# Patient Record
Sex: FEMALE | Race: WHITE | NOT HISPANIC OR LATINO
[De-identification: names, ages, dates, MRNs, and addresses within clinical notes are randomized per-mention and may not be internally consistent; named-entity substitution may affect disease eponyms.]

---

## 2020-11-23 ENCOUNTER — APPOINTMENT (OUTPATIENT)
Dept: OBGYN | Facility: CLINIC | Age: 45
End: 2020-11-23
Payer: COMMERCIAL

## 2020-11-23 VITALS
SYSTOLIC BLOOD PRESSURE: 126 MMHG | HEIGHT: 64 IN | WEIGHT: 205.25 LBS | DIASTOLIC BLOOD PRESSURE: 80 MMHG | BODY MASS INDEX: 35.04 KG/M2

## 2020-11-23 DIAGNOSIS — Z80.1 FAMILY HISTORY OF MALIGNANT NEOPLASM OF TRACHEA, BRONCHUS AND LUNG: ICD-10-CM

## 2020-11-23 DIAGNOSIS — Z01.419 ENCOUNTER FOR GYNECOLOGICAL EXAMINATION (GENERAL) (ROUTINE) W/OUT ABNORMAL FINDINGS: ICD-10-CM

## 2020-11-23 DIAGNOSIS — Z00.00 ENCOUNTER FOR GENERAL ADULT MEDICAL EXAMINATION W/OUT ABNORMAL FINDINGS: ICD-10-CM

## 2020-11-23 DIAGNOSIS — Z80.8 FAMILY HISTORY OF MALIGNANT NEOPLASM OF OTHER ORGANS OR SYSTEMS: ICD-10-CM

## 2020-11-23 DIAGNOSIS — Z87.891 PERSONAL HISTORY OF NICOTINE DEPENDENCE: ICD-10-CM

## 2020-11-23 DIAGNOSIS — Z86.59 PERSONAL HISTORY OF OTHER MENTAL AND BEHAVIORAL DISORDERS: ICD-10-CM

## 2020-11-23 DIAGNOSIS — Z83.3 FAMILY HISTORY OF DIABETES MELLITUS: ICD-10-CM

## 2020-11-23 PROCEDURE — 99072 ADDL SUPL MATRL&STAF TM PHE: CPT

## 2020-11-23 PROCEDURE — 99396 PREV VISIT EST AGE 40-64: CPT

## 2020-11-23 RX ORDER — ESCITALOPRAM OXALATE 20 MG/1
20 TABLET, FILM COATED ORAL
Refills: 0 | Status: ACTIVE | COMMUNITY

## 2020-11-23 NOTE — HISTORY OF PRESENT ILLNESS
[Condoms] : uses condoms [Y] : Patient is sexually active [Regular Cycle Intervals] : periods have been regular [Frequency: Q ___ days] : menstrual periods occur approximately every [unfilled] days [Menarche Age: ____] : age at menarche was [unfilled] [PGHxTotal] : 2 [[facility]] : 1 [PGHxPremature] : 0 [PGHxAbortions] : 1 [[facility]] : 1 [PGHxABInduced] : 1 [PGHxABSpont] : 0 [PGHxEctopic] : 0 [PGHxMultBirths] : 0

## 2020-11-24 LAB — HPV HIGH+LOW RISK DNA PNL CVX: NOT DETECTED

## 2020-11-30 LAB — CYTOLOGY CVX/VAG DOC THIN PREP: ABNORMAL

## 2020-12-23 PROBLEM — Z01.419 ENCOUNTER FOR GYNECOLOGICAL EXAMINATION: Status: RESOLVED | Noted: 2020-11-23 | Resolved: 2020-12-23

## 2021-03-11 ENCOUNTER — NON-APPOINTMENT (OUTPATIENT)
Age: 46
End: 2021-03-11

## 2022-09-28 ENCOUNTER — APPOINTMENT (OUTPATIENT)
Dept: OBGYN | Facility: CLINIC | Age: 47
End: 2022-09-28

## 2022-09-28 VITALS
DIASTOLIC BLOOD PRESSURE: 80 MMHG | SYSTOLIC BLOOD PRESSURE: 140 MMHG | BODY MASS INDEX: 35.34 KG/M2 | WEIGHT: 207 LBS | HEIGHT: 64 IN

## 2022-09-28 PROCEDURE — 99396 PREV VISIT EST AGE 40-64: CPT

## 2022-09-28 NOTE — HISTORY OF PRESENT ILLNESS
[FreeTextEntry1] : 47yo P2 presents for annual GYN exam. denies any GI/ complaints\par LMP 09/28/2022

## 2022-09-28 NOTE — PHYSICAL EXAM
[Appropriately responsive] : appropriately responsive [Alert] : alert [No Acute Distress] : no acute distress [Soft] : soft [Non-tender] : non-tender [Non-distended] : non-distended [No HSM] : No HSM [No Lesions] : no lesions [No Mass] : no mass [Oriented x3] : oriented x3 [Examination Of The Breasts] : a normal appearance [No Masses] : no breast masses were palpable [Labia Majora] : normal [Labia Minora] : normal [Scant] : There was scant vaginal bleeding [Normal] : normal [Uterine Adnexae] : normal

## 2022-09-28 NOTE — PLAN
[FreeTextEntry1] : PLAN:\par \par Normal GYN exam\par PAP smear w/ HPV obtained\par GC/CT- obtained\par \par mammogram referral given\par \par RTO 1 yr or prn

## 2022-09-28 NOTE — HISTORY OF PRESENT ILLNESS
[FreeTextEntry1] : 45yo P2 presents for annual GYN exam. denies any GI/ complaints\par LMP 09/28/2022

## 2022-09-29 ENCOUNTER — APPOINTMENT (OUTPATIENT)
Dept: DERMATOLOGY | Facility: CLINIC | Age: 47
End: 2022-09-29

## 2022-09-29 LAB
C TRACH RRNA SPEC QL NAA+PROBE: NOT DETECTED
HPV HIGH+LOW RISK DNA PNL CVX: NOT DETECTED
N GONORRHOEA RRNA SPEC QL NAA+PROBE: NOT DETECTED
SOURCE TP AMPLIFICATION: NORMAL

## 2022-09-29 PROCEDURE — 99203 OFFICE O/P NEW LOW 30 MIN: CPT

## 2022-10-03 LAB — CYTOLOGY CVX/VAG DOC THIN PREP: NORMAL

## 2023-03-14 ENCOUNTER — APPOINTMENT (OUTPATIENT)
Dept: ORTHOPEDIC SURGERY | Facility: CLINIC | Age: 48
End: 2023-03-14
Payer: COMMERCIAL

## 2023-03-14 VITALS — WEIGHT: 207 LBS | BODY MASS INDEX: 35.34 KG/M2 | HEIGHT: 64 IN

## 2023-03-14 DIAGNOSIS — M65.4 RADIAL STYLOID TENOSYNOVITIS [DE QUERVAIN]: ICD-10-CM

## 2023-03-14 PROCEDURE — 99204 OFFICE O/P NEW MOD 45 MIN: CPT | Mod: 25

## 2023-03-14 PROCEDURE — 73110 X-RAY EXAM OF WRIST: CPT | Mod: RT

## 2023-03-14 PROCEDURE — 20550 NJX 1 TENDON SHEATH/LIGAMENT: CPT

## 2023-03-14 NOTE — IMAGING
[de-identified] : Right wrist with mild swelling at radial styloid, no erythema, skin intact. +ttp at radial styloid, +finkelsetins. -ttp at thumb cmc, MP, IP and A1 pulley. Able to make fist, oppose thumb to small finger and abduct fingers. Sensation intact throughout. <2sec cap refill.\par \par Right wrist radiographs without fracture nor dislocation.

## 2023-03-14 NOTE — ASSESSMENT
[FreeTextEntry1] : Right DeQuervain's Tenosynovitis - reviewed and pathoanatomy with patient and discussed treatment ladder including NSAIDs prn, bracing, activity modification, CSI and potentially 1st dorsal compartment release. After discussion of risks/benefits to CSI, patient elected to proceed.\par \par Procedure 1 - 0.5cc 1%lidocaine + 0.5cc 40mg/cc Kenalog administered to right 1st extensor compartment following sterilization with Betadine. Patient tolerated this well.\par \par F/u 3months

## 2023-03-14 NOTE — HISTORY OF PRESENT ILLNESS
[de-identified] : 47F, RHD, PMHX of Depression presents with right wrist pain which presented suddenly end of February 2023. patient repots going to [facility], radiographs obtained and negative for fracture/dislocaiton (does not have disc today). Was given a brace, wore it for 2 weeks without relief. Denies numbness/tingling. Patient reports pain starts at the base of the thumb and radiates down into the wrist.

## 2023-10-10 ENCOUNTER — APPOINTMENT (OUTPATIENT)
Dept: OBGYN | Facility: CLINIC | Age: 48
End: 2023-10-10
Payer: COMMERCIAL

## 2023-10-10 VITALS
BODY MASS INDEX: 35.06 KG/M2 | HEIGHT: 64 IN | SYSTOLIC BLOOD PRESSURE: 120 MMHG | WEIGHT: 205.38 LBS | DIASTOLIC BLOOD PRESSURE: 80 MMHG

## 2023-10-10 DIAGNOSIS — Z12.4 ENCOUNTER FOR SCREENING FOR MALIGNANT NEOPLASM OF CERVIX: ICD-10-CM

## 2023-10-10 DIAGNOSIS — Z12.31 ENCOUNTER FOR SCREENING MAMMOGRAM FOR MALIGNANT NEOPLASM OF BREAST: ICD-10-CM

## 2023-10-10 PROCEDURE — 99396 PREV VISIT EST AGE 40-64: CPT

## 2023-10-17 LAB — CYTOLOGY CVX/VAG DOC THIN PREP: NORMAL

## 2023-12-03 ENCOUNTER — NON-APPOINTMENT (OUTPATIENT)
Age: 48
End: 2023-12-03

## 2023-12-11 ENCOUNTER — NON-APPOINTMENT (OUTPATIENT)
Age: 48
End: 2023-12-11

## 2023-12-12 ENCOUNTER — RESULT REVIEW (OUTPATIENT)
Age: 48
End: 2023-12-12

## 2023-12-12 ENCOUNTER — NON-APPOINTMENT (OUTPATIENT)
Age: 48
End: 2023-12-12

## 2023-12-15 ENCOUNTER — APPOINTMENT (OUTPATIENT)
Dept: OBGYN | Facility: CLINIC | Age: 48
End: 2023-12-15
Payer: COMMERCIAL

## 2023-12-15 PROCEDURE — 99213 OFFICE O/P EST LOW 20 MIN: CPT | Mod: 95

## 2023-12-15 NOTE — RESULTS/DATA
[TextEntry] : stereotactic biopsy of the left breast-ductal carcinoma in situ, intermediate to high nuclear grade, micropapillary pattern with comedo type necrosis, lobular extension and microcalcifications

## 2023-12-15 NOTE — REASON FOR VISIT
[Home] : at home, [unfilled] , at the time of the visit. [Medical Office: ([facility])___] : at the medical office located in  [Patient] : the patient [Follow-Up] : a follow-up evaluation of [FreeTextEntry2] : a stereotactic biopsy of the left breast.

## 2023-12-27 ENCOUNTER — APPOINTMENT (OUTPATIENT)
Dept: SURGERY | Facility: CLINIC | Age: 48
End: 2023-12-27
Payer: COMMERCIAL

## 2023-12-27 VITALS
BODY MASS INDEX: 30.9 KG/M2 | WEIGHT: 181 LBS | DIASTOLIC BLOOD PRESSURE: 80 MMHG | HEIGHT: 64 IN | SYSTOLIC BLOOD PRESSURE: 124 MMHG

## 2023-12-27 DIAGNOSIS — R92.2 INCONCLUSIVE MAMMOGRAM: ICD-10-CM

## 2023-12-27 DIAGNOSIS — R92.30 INCONCLUSIVE MAMMOGRAM: ICD-10-CM

## 2023-12-27 DIAGNOSIS — R92.1 MAMMOGRAPHIC CALCIFICATION FOUND ON DIAGNOSTIC IMAGING OF BREAST: ICD-10-CM

## 2023-12-27 PROCEDURE — 99205 OFFICE O/P NEW HI 60 MIN: CPT

## 2023-12-27 NOTE — ASSESSMENT
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   We discussed the patient's new diagnosis of DCIS and the pathophysiology of the disease process. We reviewed her work-up, imaging and pathology results to date including her hormone receptor status. We discussed the difference between systemic and local treatment and options for each type of control. We discussed the role of a breast surgeon, medical oncologist, and radiation oncologist in the patients' treatment plan. We discussed the indications of surgery, anti-estrogen therapy, chemotherapy, radiation therapy, and the role of genetic testing. Genetic testing was drawn in the office today.      We reviewed the surgical options of mastectomy versus breast conservation therapy (BCT).  We discussed the equivalent survival outcomes for patients treated with lumpectomy/radiation or mastectomy. We discussed reconstruction in the mastectomy setting including implant-based, autologous, or flat closure, as well as in the lumpectomy setting with reduction/mastopexy. She would likely be a BCT candidate, however, I would like her to obtain an MRI to further evaluate the extent of disease and evaluate for contralateral disease. We also discussed that she would benefit from oncoplastics given the size of the cancer and degree of ptosis to her breasts. Additionally would provide her with better symmetry.      We discussed the role of SLNB in the in situ and invasive setting. SLNB is not indicated at this time, but may be recommended int he setting that invasive cancer is found on final pathology. We discussed the associated risks of lymphedema. Additionally, she did have a palpable left axillary lymph node, and I would like her to go for second look axillary US for further evaluation.   I will refer her on to medical oncology and radiation oncology and have her followup after the above is complete for final surgical planning.   Patient verbalized understanding of all treatment plans. All of her questions were answered to the best of my ability. She was encouraged to call the office for any questions or concerns sooner.   Plan 1. MRI breast 2. Left axillary US 3. Genetics 4. Med onc-[facility]eder 5. Rad onc-[facility] 6. Plastics-Pete  7. Followup after above is complete

## 2023-12-27 NOTE — HISTORY OF PRESENT ILLNESS
[FreeTextEntry1] : Oncology List:  1. Left UOQ, DCIS, grade 2-3, ER 90%, UT 20%       -Approx 4cm area of calcs      -Left palpable adenopathy   Care Team:  ELIGIO Dove   HPI: Patient is a 48-year-old female presenting for initial consultation on 23 for newly diagnosed left DCIS. Patient underwent screening mammogram and breast ultrasound on 23 that demonstrated suspicious clusters of microcalcifications in the left breast. Patient called back on 23 for left diagnostic mammogram that demonstrated suspicious heterogenous grouped amorphous microcalcifications in the upper outer quadrant. Left stereotactic biopsy demonstrated DCIS, intermediate to high nuclear grade, micropapillary pattern with comedo type necrosis, lobular extension and microcalcifications. Patient denies breast pain, lump or nipple discharge.   Breast History: Goes for mammogram every year. No prior breast biopsy. No family history of breast cancer   Smoker No AC or aspirin use NKDA, no latex allergy   Imagin23: ZPR: Screening Mammogram Bilateral: Impression - Suspicious clusters of microcalcifications is seen in the left breast. Magnification views and additional radiographic and sonographic evaluation as needed is recommended. BI-RADS 0   23: ZPR: Breast Ultrasound Complete: Impression - Bilateral cysts. BI-RADS 2   23: ZPR: Diagnostic Mammogram Left: Density C, Impression - Suspicious heterogenous grouped amorphous mircocalcifications upper outer quadrant. Stereotactic core needle biopsy is recommended. BI-RADS 4   23: ZPR: Left Stereotactic Core Biopsy: Bar clip, Impression - DCIS, intermediate to high nuclear grade, micropapillary pattern with comedo type necrosis, lobular extension and microcalcifications, ER 90%, UT 20%, CONCORDANT

## 2023-12-27 NOTE — PHYSICAL EXAM
[Normocephalic] : normocephalic [Atraumatic] : atraumatic [EOMI] : extra ocular movement intact [PERRL] : pupils equal, round and reactive to light [Sclera nonicteric] : sclera nonicteric [Conjunctiva pink] : conjunctiva pink [Supple] : supple [No Supraclavicular Adenopathy] : no supraclavicular adenopathy [No Cervical Adenopathy] : no cervical adenopathy [Examined in the supine and seated position] : examined in the supine and seated position [No Axillary Lymphadenopathy] : no left axillary lymphadenopathy [No Edema] : no edema [Symmetrical] : symmetrical [Bra Size: ___] : Bra Size: [unfilled] [Grade 2] : Ptosis Grade 2 [No dominant masses] : no dominant masses in right breast  [No Nipple Retraction] : no left nipple retraction [No Nipple Discharge] : no left nipple discharge [de-identified] : L slightly larger than R, grade 2-3 ptosis  [de-identified] : Resolving ecchymosis/post-biopsy changes. Palpable density in the UOQ, unclear if this is all post-biopsy hematoma  [de-identified] : Palpable, mobile lymph node

## 2023-12-27 NOTE — PAST MEDICAL HISTORY
[Menarche Age ____] : age at menarche was [unfilled] [Definite ___ (Date)] : the last menstrual period was [unfilled] [Total Preg ___] : G[unfilled] [Live Births ___] : P[unfilled]  [Age At Live Birth ___] : Age at live birth: [unfilled] [FreeTextEntry5] : denies  [FreeTextEntry6] : denies  [FreeTextEntry7] : yes  [FreeTextEntry8] : yes 4 months

## 2024-01-04 ENCOUNTER — NON-APPOINTMENT (OUTPATIENT)
Age: 49
End: 2024-01-04

## 2024-01-04 ENCOUNTER — APPOINTMENT (OUTPATIENT)
Dept: PLASTIC SURGERY | Facility: CLINIC | Age: 49
End: 2024-01-04
Payer: COMMERCIAL

## 2024-01-04 VITALS
DIASTOLIC BLOOD PRESSURE: 84 MMHG | WEIGHT: 181 LBS | HEIGHT: 64 IN | SYSTOLIC BLOOD PRESSURE: 130 MMHG | BODY MASS INDEX: 30.9 KG/M2

## 2024-01-04 DIAGNOSIS — F19.90 OTHER PSYCHOACTIVE SUBSTANCE USE, UNSPECIFIED, UNCOMPLICATED: ICD-10-CM

## 2024-01-04 DIAGNOSIS — F17.290 NICOTINE DEPENDENCE, OTHER TOBACCO PRODUCT, UNCOMPLICATED: ICD-10-CM

## 2024-01-04 PROCEDURE — 99205 OFFICE O/P NEW HI 60 MIN: CPT

## 2024-01-05 ENCOUNTER — OUTPATIENT (OUTPATIENT)
Dept: OUTPATIENT SERVICES | Facility: HOSPITAL | Age: 49
LOS: 1 days | End: 2024-01-05
Payer: COMMERCIAL

## 2024-01-05 ENCOUNTER — APPOINTMENT (OUTPATIENT)
Dept: MRI IMAGING | Facility: CLINIC | Age: 49
End: 2024-01-05
Payer: COMMERCIAL

## 2024-01-05 DIAGNOSIS — D05.12 INTRADUCTAL CARCINOMA IN SITU OF LEFT BREAST: ICD-10-CM

## 2024-01-05 PROCEDURE — 77049 MRI BREAST C-+ W/CAD BI: CPT | Mod: 26

## 2024-01-05 PROCEDURE — A9585: CPT

## 2024-01-05 PROCEDURE — C8937: CPT

## 2024-01-05 PROCEDURE — C8908: CPT

## 2024-01-08 ENCOUNTER — NON-APPOINTMENT (OUTPATIENT)
Age: 49
End: 2024-01-08

## 2024-01-09 RX ORDER — OMEGA-3/DHA/EPA/FISH OIL 300-1000MG
CAPSULE ORAL
Refills: 0 | Status: ACTIVE | COMMUNITY

## 2024-01-09 RX ORDER — MULTIVIT-MIN/IRON/FOLIC ACID/K 18-600-40
CAPSULE ORAL
Refills: 0 | Status: ACTIVE | COMMUNITY

## 2024-01-09 NOTE — ASSESSMENT
[FreeTextEntry1] : 47 y/o woman with left DCIS, for consultation regarding reconstruction.  We discussed breast reconstruction in general, including flap and implant-based reconstruction, as well as the option of oncoplastic breast reconstruction after lumpectomy. This option seems to fit with pt's needs and expectations best, given the size of her breasts and ptosis.  I believe she will achieve a good reconstructive result as well as relief of some of her neck and back symptoms due to the weight of her breasts.   We reviewed the R/B/A of the procedure, including, but not limited to, the risks of decreased sensation or loss of sensation of the NAC, partial or complete NAC necrosis, wound healing problems requiring wound care, especially at the T-junction points of the incisions. I went over the different scar shapes and their positions on the breasts.   We also reviewed risks of surgery in general (bleeding or hematoma requiring a return to the operating room, and infection).   We also went over the potential for a loss of volume of the involved breast after radiation, and how this cannot be precisely predicted preoperatively, and may require further surgery to obtain symmetry.  Patient is to have MRI tomorrow and will review the results with Dr Israel. Dr Freeman to discuss case and best options with Dr Israel as well. Follow up with patient and schedule surgery. All questions and concerns addressed  Plan and patient status as per Dr Freeman

## 2024-01-09 NOTE — PHYSICAL EXAM
[NI] : Normal [Bra Size: _______] : Bra Size: [unfilled] [de-identified] : Grade 3 + Ptotic breasts with left slightly larger than the right. Right NAC more laterally oriented than the left. Involutional change. No palpable masses noted [de-identified] : Excess adipose. Adequate for a unilateral reconstruction or small bilateral recon

## 2024-01-09 NOTE — HISTORY OF PRESENT ILLNESS
[FreeTextEntry1] : "We have some questions."  This is a 47 y/o female who presents to the office with her  today.  She had a recent diagnosis of Left breast DCIS confirmed on stereotactic biopsy. Patient was referred by Dr Israel for a consultation for breast reconstruction, and states that she has some questions about her options for surgery.  She states that she is having her MRI performed tomorrow and understands that those results may impact her choices for surgery and reconstruction.  She is with her  and has no other complaints today.   She reports that she has lost about 34 pounds, initially intentionally, but the then recently more because of stress following her diagnosis. She reports she normally wears a 38 DD bra, but they are a bit big on her since she lost the weight.  Supplements: CBD and THC gummies Jubilance - oxaloacetate   From Dr. Israel's note: Menstrual Hx:. age at menarche was 13. the last menstrual period was 12/9.   Past Gyn Surgeries: denies.   Prior pregnancies: G2 and P1 . Age at live birth: 36   Fertility Treatments: denies.   Birth Control Pill Use: yes.   Breast Feeding History: yes 4 months

## 2024-01-09 NOTE — REASON FOR VISIT
[Initial Eval - Existing Diagnosis] : an initial evaluation of an existing diagnosis [Consultation] : a consultation visit [FreeTextEntry1] : Dr. Joanna Israel

## 2024-01-09 NOTE — REVIEW OF SYSTEMS
[Negative] : Respiratory [Recent Weight Loss (___ Lbs)] : recent [unfilled] ~Ulb weight loss [FreeTextEntry2] : Weight loss was partly intentional and partly due to recent stress over diagnosis.

## 2024-01-16 ENCOUNTER — APPOINTMENT (OUTPATIENT)
Dept: ULTRASOUND IMAGING | Facility: CLINIC | Age: 49
End: 2024-01-16
Payer: COMMERCIAL

## 2024-01-16 ENCOUNTER — OUTPATIENT (OUTPATIENT)
Dept: OUTPATIENT SERVICES | Facility: HOSPITAL | Age: 49
LOS: 1 days | End: 2024-01-16
Payer: COMMERCIAL

## 2024-01-16 DIAGNOSIS — D05.12 INTRADUCTAL CARCINOMA IN SITU OF LEFT BREAST: ICD-10-CM

## 2024-01-16 DIAGNOSIS — Z00.8 ENCOUNTER FOR OTHER GENERAL EXAMINATION: ICD-10-CM

## 2024-01-16 PROCEDURE — 76882 US LMTD JT/FCL EVL NVASC XTR: CPT | Mod: 26,LT

## 2024-01-16 PROCEDURE — 76882 US LMTD JT/FCL EVL NVASC XTR: CPT

## 2024-01-23 ENCOUNTER — APPOINTMENT (OUTPATIENT)
Dept: PLASTIC SURGERY | Facility: CLINIC | Age: 49
End: 2024-01-23
Payer: COMMERCIAL

## 2024-01-23 VITALS
OXYGEN SATURATION: 98 % | DIASTOLIC BLOOD PRESSURE: 82 MMHG | TEMPERATURE: 97.8 F | HEIGHT: 64 IN | WEIGHT: 177 LBS | SYSTOLIC BLOOD PRESSURE: 128 MMHG | RESPIRATION RATE: 16 BRPM | BODY MASS INDEX: 30.22 KG/M2 | HEART RATE: 72 BPM

## 2024-01-23 PROCEDURE — 99214 OFFICE O/P EST MOD 30 MIN: CPT

## 2024-01-23 NOTE — HISTORY OF PRESENT ILLNESS
[FreeTextEntry1] : "I have some questions"  Patient presents with her spouse to discuss the plans for reconstruction after her last visit.  She states now she is not a candidate for oncoplastic reconstruction based on her MRI findings.  She is a candidate for a Left non- nipple sparing mastectomy and is opting likely for bilateral mastectomy.  She is not certain about which route of reconstruction she would be interested in and has some questions with regards to recovery and long term care.

## 2024-01-23 NOTE — PHYSICAL EXAM
[NI] : Normal [Bra Size: _______] : Bra Size: [unfilled] [de-identified] : Grade 3 + Ptosis breasts with left slightly large than left  No palpable masses noted

## 2024-01-23 NOTE — PHYSICAL EXAM
[NI] : Normal [Bra Size: _______] : Bra Size: [unfilled] [de-identified] : Grade 3 + Ptosis breasts with left slightly large than left  No palpable masses noted

## 2024-01-23 NOTE — ASSESSMENT
[FreeTextEntry1] : 47 y/o female for consultation for breast reconstruction after bilateral mastectomy   Bilateral mastectomy is being planned, and pt is here for consultation regarding breast reconstruction. We discussed the spectrum of reconstructive options, including no reconstruction, implant based reconstruction and autologous reconstruction. We discussed the risk/benefit ratio for each of these options. We discussed at length the R/B/A of implant based reconstruction, including the need for a two-stage reconstruction. We discussed that implants are not considered lifetime devices, and may need to be replaced in the future. We discussed the risks of infection requiring implant removal, rupture, capsular contracture and implant exposure (especially following radiation). We discussed at length the R/B/A of abdominally based autologous reconstruction, including, but not limited to complete flap failure, scarring, poor wound healing, bleeding/hematoma, infection, return to OR for attempted salvage. We also discussed the perioperative course, including CTA/MRA for planning, postoperative monitoring and drains and expected length of surgery and hospital stay. She is aware that abdominal weakness is also a possibility.   She has opted for Bilateral mastectomies with Implant -based reconstruction.

## 2024-01-24 ENCOUNTER — APPOINTMENT (OUTPATIENT)
Dept: SURGERY | Facility: CLINIC | Age: 49
End: 2024-01-24
Payer: COMMERCIAL

## 2024-01-24 VITALS
HEIGHT: 64 IN | BODY MASS INDEX: 30.22 KG/M2 | SYSTOLIC BLOOD PRESSURE: 138 MMHG | DIASTOLIC BLOOD PRESSURE: 90 MMHG | WEIGHT: 177 LBS

## 2024-01-24 PROCEDURE — 99215 OFFICE O/P EST HI 40 MIN: CPT

## 2024-01-24 NOTE — PHYSICAL EXAM
[Normocephalic] : normocephalic [Atraumatic] : atraumatic [EOMI] : extra ocular movement intact [PERRL] : pupils equal, round and reactive to light [Sclera nonicteric] : sclera nonicteric [Conjunctiva pink] : conjunctiva pink [Supple] : supple [No Supraclavicular Adenopathy] : no supraclavicular adenopathy [No Cervical Adenopathy] : no cervical adenopathy [Examined in the supine and seated position] : examined in the supine and seated position [Symmetrical] : symmetrical [Bra Size: ___] : Bra Size: [unfilled] [No dominant masses] : no dominant masses in right breast  [No Nipple Retraction] : no left nipple retraction [No Nipple Discharge] : no left nipple discharge [No Axillary Lymphadenopathy] : no left axillary lymphadenopathy [No Edema] : no edema [Asymmetrical] : asymmetrical [Grade 2] : Ptosis Grade 2 [de-identified] : Deferred- exam findings from initial consultation: L>R, grade 2-3 ptosis [de-identified] : Palpable density in the UOQ, unclear if this is all post-biopsy hematoma   [de-identified] : Palpable, mobile lymph node

## 2024-01-24 NOTE — HISTORY OF PRESENT ILLNESS
[FreeTextEntry1] : Oncology List:  1. Left UOQ, DCIS, grade 2-3, ER 90%, [place] 20%       -Approx 4cm area of calcs, MRI 11. 6 cm x 3. 6 cm extends to 1.1 cm deep to nipple      -Left palpable adenopathy, US - benign appearing lymph nodes       -Genetic testing negative for pathogenic mutation; VUS in POLE gene c.3856C>T and [id number] gene c.292G>A   Care Team:  GYN - Petty  Med onc-[facility] Rad onc-[facility] Plastics-[facility]   Interval History:  (24): Patient presents for surgical planning. She met with Dr. Freeman to discuss reconstruction options and Dr. Castro from medical oncology. Has decided on bilateral mastectomy with TE with left SLNB.     HPI: Patient is a 48-year-old female presenting for initial consultation on 23 for newly diagnosed left DCIS. Patient underwent screening mammogram and breast ultrasound on 23 that demonstrated suspicious clusters of microcalcifications in the left breast. Patient called back on 23 for left diagnostic mammogram that demonstrated suspicious heterogenous grouped amorphous microcalcifications in the upper outer quadrant. Left stereotactic biopsy demonstrated DCIS, intermediate to high nuclear grade, micropapillary pattern with comedo type necrosis, lobular extension and microcalcifications. Patient denies breast pain, lump or nipple discharge.   Breast History: Goes for mammogram every year. No prior breast biopsy. No family history of breast cancer   Smoker No AC or aspirin use NKDA, no latex allergy   Imagin23: ZPR: Screening Mammogram Bilateral: Impression - Suspicious clusters of microcalcifications is seen in the left breast. Magnification views and additional radiographic and sonographic evaluation as needed is recommended. BI-RADS 0   23: ZPR: Breast Ultrasound Complete: Impression - Bilateral cysts. BI-RADS 2   23: ZPR: Diagnostic Mammogram Left: Density C, Impression - Suspicious heterogenous grouped amorphous mircocalcifications upper outer quadrant. Stereotactic core needle biopsy is recommended. BI-RADS 4   23: ZPR: Left Stereotactic Core Biopsy: Bar clip, Impression - DCIS, intermediate to high nuclear grade, micropapillary pattern with comedo type necrosis, lobular extension and microcalcifications, ER 90%, [place] 20%, CONCORDANT   24: Tony Borges: MRI Breast: Impression - Newly diagnosed malignancy in the left breast. Extensive asymmetric non mass enhancement occupying most of the upper outer quadrant of the left breast, corresponding in size and distribution to abnormal calcifications noted by mammography, overall enhancement spans up to 11. 6 cm x 3.6 cm. Enhancement extends to just 1.1 cm deep to the nipple. BI-RADS 6  24: Tony Borges: Left Axilla US - Impression - no evidence of left axillary lymphadenopathy. Bilateral benign appearing axillary lymph nodes demonstrated.

## 2024-01-24 NOTE — ASSESSMENT
[FreeTextEntry1] : Patient is a 48 year old female presenting for left DCIS UOQ, grade 2, ER 90%, [place] 20%. Approx 4cm area of calcs, MRI 11. 6 cm x 3. 6 cm extends to 1.1 cm deep to nipple, Left palpable adenopathy, US - benign appearing lymph nodes   She met with Dr. Freeman to discuss reconstruction options and Dr. Castro from medical oncology. We had an extensive discussion regarding unilateral vs bilateral, as well as the need for excision of the NAC. We also discussed axillary surgery options including SLNB (which I favor given the extent of DCIS-even though no invasive component has been proven at this point), vs magtrace injection with plan for return to the OR for SLNB at a later date if upgraded to IDC on final pathology. She would like to proceed with bilateral skin sparing mastectomy with TE and left SLNB. Risks of lymphedema were discussed, as well as lymphedema surveillance. Baseline SOZO obtained in the office today.   Discussed the overnight admission process, need for PST/medical clearance prior to surgery, as well as post-op expectations, recovery, and need for post-op drains. I will go over final pathology results with patient 7-10 days after surgery.   Smoking cessation was also discussed, as well as risks of continued smoking in the perioop setting.   Patient verbalized understanding of all treatment plans. All of her questions were answered to the best of my ability. She was encouraged to call the office for any questions or concerns.   Plan: 1. Bilateral skin sparing mastectomy with TE and left SLNB 2. PST/medical clearance 3. Post op visit  4. Smoking cessation 5. Baseline SOZO L Dex Left -4.4

## 2024-02-06 ENCOUNTER — RESULT REVIEW (OUTPATIENT)
Age: 49
End: 2024-02-06

## 2024-02-06 ENCOUNTER — APPOINTMENT (OUTPATIENT)
Dept: PLASTIC SURGERY | Facility: AMBULATORY MEDICAL SERVICES | Age: 49
End: 2024-02-06
Payer: COMMERCIAL

## 2024-02-06 ENCOUNTER — APPOINTMENT (OUTPATIENT)
Dept: SURGERY | Facility: AMBULATORY MEDICAL SERVICES | Age: 49
End: 2024-02-06
Payer: COMMERCIAL

## 2024-02-06 PROCEDURE — 15860 IV NJX TST VASC FLO FLAP/GRF: CPT

## 2024-02-06 PROCEDURE — 38792 RA TRACER ID OF SENTINL NODE: CPT | Mod: LT,59

## 2024-02-06 PROCEDURE — 15777 ACELLULAR DERM MATRIX IMPLT: CPT | Mod: 50

## 2024-02-06 PROCEDURE — 38900 IO MAP OF SENT LYMPH NODE: CPT | Mod: LT

## 2024-02-06 PROCEDURE — 14301 TIS TRNFR ANY 30.1-60 SQ CM: CPT

## 2024-02-06 PROCEDURE — 14302 TIS TRNFR ADDL 30 SQ CM: CPT

## 2024-02-06 PROCEDURE — 19357 TISS XPNDR PLMT BRST RCNSTJ: CPT | Mod: 50

## 2024-02-06 PROCEDURE — 19303 MAST SIMPLE COMPLETE: CPT | Mod: 50

## 2024-02-06 PROCEDURE — 38525 BIOPSY/REMOVAL LYMPH NODES: CPT | Mod: LT

## 2024-02-14 ENCOUNTER — APPOINTMENT (OUTPATIENT)
Dept: PLASTIC SURGERY | Facility: CLINIC | Age: 49
End: 2024-02-14
Payer: COMMERCIAL

## 2024-02-14 VITALS
WEIGHT: 177 LBS | HEIGHT: 64 IN | DIASTOLIC BLOOD PRESSURE: 88 MMHG | HEART RATE: 65 BPM | SYSTOLIC BLOOD PRESSURE: 130 MMHG | OXYGEN SATURATION: 98 % | BODY MASS INDEX: 30.22 KG/M2

## 2024-02-14 PROCEDURE — 99024 POSTOP FOLLOW-UP VISIT: CPT

## 2024-02-20 ENCOUNTER — APPOINTMENT (OUTPATIENT)
Dept: SURGERY | Facility: CLINIC | Age: 49
End: 2024-02-20
Payer: COMMERCIAL

## 2024-02-20 VITALS
SYSTOLIC BLOOD PRESSURE: 138 MMHG | DIASTOLIC BLOOD PRESSURE: 82 MMHG | WEIGHT: 177 LBS | BODY MASS INDEX: 30.22 KG/M2 | HEIGHT: 64 IN

## 2024-02-20 DIAGNOSIS — Z17.0 MALIGNANT NEOPLASM OF UPPER-OUTER QUADRANT OF LEFT FEMALE BREAST: ICD-10-CM

## 2024-02-20 DIAGNOSIS — Z90.13 ACQUIRED ABSENCE OF BILATERAL BREASTS AND NIPPLES: ICD-10-CM

## 2024-02-20 DIAGNOSIS — D05.12 INTRADUCTAL CARCINOMA IN SITU OF LEFT BREAST: ICD-10-CM

## 2024-02-20 DIAGNOSIS — C50.412 MALIGNANT NEOPLASM OF UPPER-OUTER QUADRANT OF LEFT FEMALE BREAST: ICD-10-CM

## 2024-02-20 PROCEDURE — 99024 POSTOP FOLLOW-UP VISIT: CPT

## 2024-02-20 NOTE — HISTORY OF PRESENT ILLNESS
[FreeTextEntry1] : Oncology List:  1. Left UOQ cTisN0 --> [id number] IDC, grade 1 w/DCIS, grade 2, ER 90%, MO/HER2 negative       -Approx 4cm area of calcs, MRI 11. 6 cm x 3. 6 cm extends to 1.1 cm deep to nipple      -Left palpable adenopathy, US - benign appearing lymph nodes       -Genetic testing negative for pathogenic mutation; VUS in POLE gene c.3856C>T and [id number] gene c.292G>A      -Bilateral skin sparing mastectomy with TE and left SLNB 24        FINAL PATH: right breast - benign breast tissue, left breast - [id number] multifocal (at least 4 foci) invasive ductal carcinoma with micropapillary features, grade 2, largest focus measure 7.0 mm, DCIS intermediate grade, at least 24.0 mm, one lymph node negative, one lymph node positive for micro metastatic carcinoma, one lymph node positive for isolated tumor cells    Care Team:  GYN - Petty  Med onc-[facility] Rad onc-[facility] Plastics-[facility]   Interval History:  (24): Patient presents for surgical planning. She met with Dr. Freeman to discuss reconstruction options and Dr. Castro from medical oncology. Has decided on bilateral mastectomy with TE with left SLNB.    24: Patient presents for post-op followup. Right drain was removed by Dr. Freeman. Left drain still putting out >30cc/day. No complaints at this time.    HPI: Patient is a 48-year-old female presenting for initial consultation on 23 for newly diagnosed left DCIS. Patient underwent screening mammogram and breast ultrasound on 23 that demonstrated suspicious clusters of microcalcifications in the left breast. Patient called back on 23 for left diagnostic mammogram that demonstrated suspicious heterogenous grouped amorphous microcalcifications in the upper outer quadrant. Left stereotactic biopsy demonstrated DCIS, intermediate to high nuclear grade, micropapillary pattern with comedo type necrosis, lobular extension and microcalcifications. Patient denies breast pain, lump or nipple discharge.   Breast History: Goes for mammogram every year. No prior breast biopsy. No family history of breast cancer   Smoker No AC or aspirin use NKDA, no latex allergy   Imagin23: ZPR: Screening Mammogram Bilateral: Impression - Suspicious clusters of microcalcifications is seen in the left breast. Magnification views and additional radiographic and sonographic evaluation as needed is recommended. BI-RADS 0   23: ZPR: Breast Ultrasound Complete: Impression - Bilateral cysts. BI-RADS 2   23: ZPR: Diagnostic Mammogram Left: Density C, Impression - Suspicious heterogenous grouped amorphous mircocalcifications upper outer quadrant. Stereotactic core needle biopsy is recommended. BI-RADS 4   23: ZPR: Left Stereotactic Core Biopsy: Bar clip, Impression - DCIS, intermediate to high nuclear grade, micropapillary pattern with comedo type necrosis, lobular extension and microcalcifications, ER 90%, [place] 20%, CONCORDANT   24: Tony Borges: MRI Breast: Impression - Newly diagnosed malignancy in the left breast. Extensive asymmetric non mass enhancement occupying most of the upper outer quadrant of the left breast, corresponding in size and distribution to abnormal calcifications noted by mammography, overall enhancement spans up to 11. 6 cm x 3.6 cm. Enhancement extends to just 1.1 cm deep to the nipple. BI-RADS 6  24: Tony Borges: Left Axilla US - Impression - no evidence of left axillary lymphadenopathy. Bilateral benign appearing axillary lymph nodes demonstrated.

## 2024-02-20 NOTE — ASSESSMENT
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 Approx 4 cm area of calcs, MRI 11. 6 CM X 3. 6 CM extends to 1.1 cm deep to nipple, left palpable adenopathy, US- benign appearing lymph nodes. She underwent bilateral skin sparing mastectomy with TE and left SLNB. FINAL PATH: right breast - benign breast tissue, left breast - [id number] multifocal invasive ductal carcinoma with micropapillary features (4 foci), grade 2, largest focus measure 7.0 mm, DCIS intermediate grade, size 24.0 mm, one lymph node negative, one lymph node positive for micro metastatic carcinoma, one lymph node positive for isolated tumor cells.  We reviewed post-op expectations and recovery. I explained to her that she is healing well without concern. Left drain is still not ready to be removed. I advised her to call our office if <30cc for two consecutive days and we can remove for her, otherwise, Dr. Freeman will remove it at a followup visit.    We reviewed her pathology in detail and I explained to her that we are complete from a surgical standpoint. We discussed the findings of invasive cancer, as well as the small amount of cancer within her lymph nodes. We discussed potential recommendations for chemo/oncotype and/or PMRT. I advised her to followup w/Dr. Castro asap, and referral for Dr. Grigsby given. Our office was able to assist her in getting these appointments. She should also followup w/Dr. Grimaldo in 3 months for next SOZO measurement.   We discussed her social situation including a father with advanced liposarcoma that will be going on hospice soon. She has an 11-year-old son that knows she had surgery for her breast, but does not know she had breast cancer. Additional resources, including  was offered, however, patient declined at this time.   We will see her back in 6 months for next CBE.  Patient verbalized understanding for all treatment plans discussed. All of her questions were answered to the best of my ability. She was encouraged to call the office if any questions or concerns or come in sooner if needed.   Plan: 1. Rad onc referral-Dr. Grigsby 2. Followup med onc-Dr. Castro 3. PMR referral for lymphedema surveillance 4. Followup in 6 months

## 2024-02-20 NOTE — PHYSICAL EXAM
[Normocephalic] : normocephalic [Atraumatic] : atraumatic [EOMI] : extra ocular movement intact [PERRL] : pupils equal, round and reactive to light [Sclera nonicteric] : sclera nonicteric [Conjunctiva pink] : conjunctiva pink [Supple] : supple [No Supraclavicular Adenopathy] : no supraclavicular adenopathy [No Cervical Adenopathy] : no cervical adenopathy [Examined in the supine and seated position] : examined in the supine and seated position [Asymmetrical] : asymmetrical [Bra Size: ___] : Bra Size: [unfilled] [Grade 2] : Ptosis Grade 2 [No Axillary Lymphadenopathy] : no left axillary lymphadenopathy [No Edema] : no edema [de-identified] : Palpable density in the UOQ, unclear if this is all post-biopsy hematoma   [None] : no ptosis [de-identified] : Bilateral breasts and NAC are surgically absent with TE in place. Skin flaps are well-perfused. No erythema. Left drain output is serosanguinous.  [de-identified] : Palpable, mobile lymph node

## 2024-02-22 NOTE — HISTORY OF PRESENT ILLNESS
[FreeTextEntry1] : "I feel good" Patient presents for follow up and states that she is doing well and that the left drain is putting out more than the right drain.  She states that she is surprised at how good the tissue expanders feel.  She states that she did not take any oxycodone and that she has no discomfort anymore.  She wants to know if she can drive.  She has no complaints today.

## 2024-02-22 NOTE — REASON FOR VISIT
[Follow-Up: _____] : a [unfilled] follow-up visit [FreeTextEntry1] : states has some normal discomfort. Also states no abnormal drainage or bleeding.

## 2024-02-22 NOTE — ASSESSMENT
[FreeTextEntry1] : 49 y/o female for follow up   Plan is for follow up in 2 weeks and 4 weeks   Patient is to follow up with Dr Israel and will likely be able to have the other LYNN drain removed. She is given a new drain sheet and is advised to continue to empty and record twice a day. Patient is advised that she may drive as she is not taking narcotics and can move her arms freely around her body.  Plan and patient status as per Dr Freeman

## 2024-02-22 NOTE — ADDENDUM
[FreeTextEntry1] : All medical record entries were at my direction and personally dictated by me (Dr. Priyanka Freeman). I have reviewed the chart and agree that the record accurately reflects my personal performance of the history, physical exam, assessment and plan.

## 2024-02-22 NOTE — PHYSICAL EXAM
[NI] : Normal [de-identified] : Incisions with steri strips in place C/D/I.  Usama in position.  Nontender bilaterally. Right LYNN drain removed without complication. Optifoam dressing placed on remaining LYNN drain. [de-identified] : No breasts s/p bilateral mastectomies

## 2024-02-29 ENCOUNTER — APPOINTMENT (OUTPATIENT)
Dept: PLASTIC SURGERY | Facility: CLINIC | Age: 49
End: 2024-02-29
Payer: COMMERCIAL

## 2024-02-29 VITALS
TEMPERATURE: 97.7 F | SYSTOLIC BLOOD PRESSURE: 136 MMHG | WEIGHT: 174 LBS | BODY MASS INDEX: 29.71 KG/M2 | HEIGHT: 64 IN | OXYGEN SATURATION: 99 % | DIASTOLIC BLOOD PRESSURE: 82 MMHG | HEART RATE: 82 BPM

## 2024-02-29 PROCEDURE — 99024 POSTOP FOLLOW-UP VISIT: CPT

## 2024-02-29 RX ORDER — OXYCODONE 5 MG/1
5 TABLET ORAL EVERY 6 HOURS
Qty: 5 | Refills: 0 | Status: COMPLETED | COMMUNITY
Start: 2024-02-05 | End: 2024-02-29

## 2024-03-07 ENCOUNTER — APPOINTMENT (OUTPATIENT)
Dept: PLASTIC SURGERY | Facility: CLINIC | Age: 49
End: 2024-03-07
Payer: COMMERCIAL

## 2024-03-07 VITALS
WEIGHT: 175 LBS | BODY MASS INDEX: 29.88 KG/M2 | TEMPERATURE: 98 F | OXYGEN SATURATION: 98 % | HEIGHT: 64 IN | DIASTOLIC BLOOD PRESSURE: 90 MMHG | SYSTOLIC BLOOD PRESSURE: 142 MMHG | HEART RATE: 78 BPM

## 2024-03-07 PROCEDURE — 99024 POSTOP FOLLOW-UP VISIT: CPT

## 2024-03-13 ENCOUNTER — APPOINTMENT (OUTPATIENT)
Dept: PLASTIC SURGERY | Facility: CLINIC | Age: 49
End: 2024-03-13
Payer: COMMERCIAL

## 2024-03-13 VITALS
TEMPERATURE: 98 F | DIASTOLIC BLOOD PRESSURE: 84 MMHG | HEART RATE: 76 BPM | SYSTOLIC BLOOD PRESSURE: 126 MMHG | OXYGEN SATURATION: 97 %

## 2024-03-13 PROCEDURE — 99024 POSTOP FOLLOW-UP VISIT: CPT

## 2024-03-20 ENCOUNTER — APPOINTMENT (OUTPATIENT)
Dept: PLASTIC SURGERY | Facility: CLINIC | Age: 49
End: 2024-03-20
Payer: COMMERCIAL

## 2024-03-20 VITALS
TEMPERATURE: 97.9 F | OXYGEN SATURATION: 98 % | WEIGHT: 175 LBS | HEIGHT: 64 IN | HEART RATE: 70 BPM | SYSTOLIC BLOOD PRESSURE: 128 MMHG | DIASTOLIC BLOOD PRESSURE: 80 MMHG | BODY MASS INDEX: 29.88 KG/M2

## 2024-03-20 PROCEDURE — 99024 POSTOP FOLLOW-UP VISIT: CPT

## 2024-03-20 RX ORDER — RIFAMPIN 300 MG/1
300 CAPSULE ORAL
Qty: 28 | Refills: 0 | Status: COMPLETED | COMMUNITY
Start: 2024-02-29 | End: 2024-03-20

## 2024-03-20 RX ORDER — MINOCYCLINE HYDROCHLORIDE 100 MG/1
100 CAPSULE ORAL
Qty: 28 | Refills: 0 | Status: COMPLETED | COMMUNITY
Start: 2024-02-29 | End: 2024-03-20

## 2024-03-20 RX ORDER — CIPROFLOXACIN HYDROCHLORIDE 500 MG/1
500 TABLET, FILM COATED ORAL
Qty: 28 | Refills: 0 | Status: COMPLETED | COMMUNITY
Start: 2024-02-29 | End: 2024-03-20

## 2024-03-20 RX ORDER — CEPHALEXIN 500 MG/1
500 CAPSULE ORAL 3 TIMES DAILY
Qty: 21 | Refills: 1 | Status: COMPLETED | COMMUNITY
Start: 2024-02-29 | End: 2024-03-20

## 2024-03-20 RX ORDER — CEPHALEXIN 500 MG/1
500 TABLET ORAL 3 TIMES DAILY
Qty: 21 | Refills: 0 | Status: COMPLETED | COMMUNITY
Start: 2024-02-05 | End: 2024-03-20

## 2024-03-20 NOTE — REASON FOR VISIT
[Follow-Up: _____] : a [unfilled] follow-up visit [Spouse] : spouse [FreeTextEntry1] : patient states everything is good and has no complaints.

## 2024-03-20 NOTE — ASSESSMENT
[FreeTextEntry1] : 47 y/o female for follow up   Follow up in one week for fill. Plan is to fill weekly prior to starting chemotherapy/radiation. All questions and concerns addressed. Patient may bathe, but should not walk her large dog that pulls her around. Plan and patient status as per Dr Triana.   Weight of mastectomies on the left is 810g and mastectomy weight on the right is 691g. TE is 650 cc with 200 cc fill intraoperatively bilaterally, 2/29/2024 an additional 60 cc bilaterally, and 3/13/2024 30 cc on the left and 60 cc on the right, 3/20/2024 30 cc on the left and 60 cc on the right, for total of 320 cc on the left and 380 cc on the right.

## 2024-03-20 NOTE — ADDENDUM
[FreeTextEntry1] : All medical record entries were at my direction and personally dictated by me (Dr. Priyanka Freeman). I have reviewed the chart and agree that the record accurately reflects my personal performance of the history, physical exam, assessment and plan.  I believe the TE position is rotated on the left, giving the added upper pole fullness. Adding more fill on the right to try to achieve more upper pole fullness. Weekly follow up to try to achieve a steady state with the fills, prior to RT.

## 2024-03-20 NOTE — PHYSICAL EXAM
[NI] : Normal [de-identified] : Incisions are C/D/I. Nontender bilaterally. drain sites are healing well. Skin of both reconstructed breasts are well moisturized. The TE on the left still appears larger. Will fill the right side more until they are more comparable. After alcohol prep, magnet localization and CHG prep, 60 fill performed on the right and 30cc on the left without difficulty.  Pt tolerated well. [de-identified] : No breasts s/p bilateral mastectomies

## 2024-03-20 NOTE — HISTORY OF PRESENT ILLNESS
[FreeTextEntry1] : "I want to fill them more"  Patient presents for follow up and states that she is happy with way the tissue expanders are filling and would like to continue to go larger.  She has some questions with regards to bathing and walking her dog.  No other complaints today.

## 2024-03-24 NOTE — REASON FOR VISIT
[Follow-Up: _____] : a [unfilled] follow-up visit [Spouse] : spouse [FreeTextEntry1] : patient states she is doing much better and no complaints. She states it still feels weird and not like breasts.

## 2024-03-24 NOTE — ASSESSMENT
[FreeTextEntry1] : 47 y/o female for follow up   Healing well Plan is to follow up in one week. All questions and concerns addressed. Plan and patient status as per Dr Freeman.  Weight of mastectomy on the left is 810 g and mastectomy weight on the right is 691 g. Usama are 650 cc with 200 cc fill intraoperatively on 2/6/24, 60 cc fill bilaterally on 2/29/2024, and 3/13/2024 30 cc on the left and 60 cc on the right for a total of 290 cc on the left and 320 cc on the right.

## 2024-03-24 NOTE — ADDENDUM
[FreeTextEntry1] : All medical record entries were at my direction and personally dictated by me (Dr. Priyanka Freeman). I have reviewed the chart and agree that the record accurately reflects my personal performance of the history, physical exam, assessment and plan. Changes noted above.

## 2024-03-24 NOTE — PHYSICAL EXAM
[NI] : Normal [de-identified] : Incisions are C/D/I. Nontender bilaterally. drain sites are healing well. Skin of both reconstructed breasts are well moisturized. After magnet localization and CHG prep, 60 ml fill on the right and 30 ml fill on the left performed without difficulty. Pt tolerated well. [de-identified] : No breasts s/p bilateral mastectomies

## 2024-03-24 NOTE — HISTORY OF PRESENT ILLNESS
[FreeTextEntry1] : "I feel good"  Patient presents with her  and states that she is feeling good, but that her tissue expanders just feel weird and the left one is higher.  She is going to start radiation and will also find out if she will be needing chemotherapy as well.   She reports she had to stop the antibiotics because she was having leg cramps, dizziness, nausea and vomiting. She had some left calf pain and she saw her PMD who order a duplex scan that was negative. Saturday morning was her last dose of the antibiotics.

## 2024-03-26 ENCOUNTER — APPOINTMENT (OUTPATIENT)
Dept: PLASTIC SURGERY | Facility: CLINIC | Age: 49
End: 2024-03-26
Payer: COMMERCIAL

## 2024-03-26 VITALS
DIASTOLIC BLOOD PRESSURE: 82 MMHG | HEIGHT: 64 IN | BODY MASS INDEX: 29.88 KG/M2 | HEART RATE: 82 BPM | TEMPERATURE: 98.2 F | WEIGHT: 175 LBS | SYSTOLIC BLOOD PRESSURE: 126 MMHG | OXYGEN SATURATION: 98 %

## 2024-03-26 PROCEDURE — 99024 POSTOP FOLLOW-UP VISIT: CPT

## 2024-03-26 NOTE — HISTORY OF PRESENT ILLNESS
[FreeTextEntry1] : Pt reports she is feeling pretty well. Just the numbness on the top of the breasts feels weird. Feeling better about the size.

## 2024-03-26 NOTE — REASON FOR VISIT
[Follow-Up: _____] : a [unfilled] follow-up visit [FreeTextEntry1] : patient states she is doing much better and has no complaints.

## 2024-03-26 NOTE — ASSESSMENT
[FreeTextEntry1] : 47 y/o female for follow up   Healing well Follow up weekly. Will continue fills until pt is happy with the size.  Weight of mastectomies on the left is 810g and mastectomy weight on the right is 691g. TE is 650 cc with 200 cc fill intraoperatively bilaterally, 2/29/2024 an additional 60 cc bilaterally, and 3/13/2024 30 cc on the left and 60 cc on the right, 3/20/2024 30 cc on the left and 60 cc on the right, 60 cc bilaterally on 3/26/24 for total of 380 cc on the left and 440 cc on the right.

## 2024-03-26 NOTE — PHYSICAL EXAM
[NI] : Normal [de-identified] : No breasts s/p bilateral mastectomies [de-identified] : Incisions are healed. drain sites are healing well. Skin of both reconstructed breasts are well moisturized. After magnet localization and CHG prep, 60 ml fill performed bilaterally without difficulty. Pt tolerated well. Right is still softer and left is more firm, but the size is looking better.

## 2024-04-04 ENCOUNTER — APPOINTMENT (OUTPATIENT)
Dept: PLASTIC SURGERY | Facility: CLINIC | Age: 49
End: 2024-04-04
Payer: COMMERCIAL

## 2024-04-04 VITALS
OXYGEN SATURATION: 98 % | DIASTOLIC BLOOD PRESSURE: 68 MMHG | BODY MASS INDEX: 29.88 KG/M2 | HEART RATE: 81 BPM | HEIGHT: 64 IN | SYSTOLIC BLOOD PRESSURE: 126 MMHG | WEIGHT: 175 LBS

## 2024-04-04 PROCEDURE — 99024 POSTOP FOLLOW-UP VISIT: CPT

## 2024-04-04 NOTE — PHYSICAL EXAM
[NI] : Normal [de-identified] : Incisions are healed. Skin of both reconstructed breasts are well moisturized. No fill today. Right is still softer and left is more firm, but the size is looking better. [de-identified] : No breasts s/p bilateral mastectomies

## 2024-04-04 NOTE — ASSESSMENT
[FreeTextEntry1] : Doing very well. Will plan to have her return in two weeks to reassess and decide whether to do any more fill. That should be her last visit with a fill, so I recommended that she call Dr. Grigsby now to let him know that we are planning to have all fills completed by the time of that visit in two weeks.

## 2024-04-04 NOTE — REASON FOR VISIT
[Follow-Up: _____] : a [unfilled] follow-up visit [FreeTextEntry1] : patient states she is doing well and no complaints.

## 2024-04-04 NOTE — HISTORY OF PRESENT ILLNESS
[FreeTextEntry1] : Pt reports she is feeling well and is pretty happy with the size of the recon breasts. She is not sure if she wants to go any bigger. She wants to know when she should make an appointment with the radiation oncologist.

## 2024-04-10 ENCOUNTER — APPOINTMENT (OUTPATIENT)
Dept: PLASTIC SURGERY | Facility: CLINIC | Age: 49
End: 2024-04-10

## 2024-04-17 ENCOUNTER — APPOINTMENT (OUTPATIENT)
Dept: PLASTIC SURGERY | Facility: CLINIC | Age: 49
End: 2024-04-17
Payer: COMMERCIAL

## 2024-04-17 VITALS
HEART RATE: 80 BPM | HEIGHT: 64 IN | TEMPERATURE: 98.2 F | BODY MASS INDEX: 29.71 KG/M2 | WEIGHT: 174 LBS | DIASTOLIC BLOOD PRESSURE: 72 MMHG | SYSTOLIC BLOOD PRESSURE: 122 MMHG | OXYGEN SATURATION: 98 %

## 2024-04-17 PROCEDURE — 99024 POSTOP FOLLOW-UP VISIT: CPT

## 2024-04-17 NOTE — HISTORY OF PRESENT ILLNESS
[FreeTextEntry1] : "I don't want any more fills." Patient presents for follow up and states that she does not want another fill of her TE.  She states that she is happy with the current size.  She also states that she may not go ahead with chemotherapy as she does not want to do it.  She states that she is still awaiting the results of the Oncotype.  She has no other complaints at this time.

## 2024-04-17 NOTE — ADDENDUM
[FreeTextEntry1] : All medical record entries were at my direction and personally dictated by me (Dr. Priyanka Freeman). I have reviewed the chart and agree that the record accurately reflects my personal performance of the history, physical exam, assessment and plan.  I encouraged pt to keep an open mind to chemotherapy, if it is determined to be in her best interest. She did speak with Dr. Grigsby, but he would like to wait for the Oncotype result before scheduling RT.

## 2024-04-17 NOTE — PHYSICAL EXAM
[NI] : Normal [de-identified] : Incisions are C/D/I. Nontender bilaterally. Skin of both reconstructed breasts are well moisturized. No fill today. [de-identified] : No breasts s/p bilateral mastectomies

## 2024-04-17 NOTE — ASSESSMENT
[FreeTextEntry1] : 49 y/o female for follow up  Patient to discuss concerns about chemotherapy with oncology. No fill of TE today or moving forward. Healed well. Follow up in 3 weeks. Plan and patient status as per Dr Freeman.   Weight of mastectomies on the left is 810g and mastectomy weight on the right is 691g. TE is 650 cc with 200 cc fill intraoperatively bilaterally, 2/29/2024 an additional 60 cc bilaterally, and 3/13/2024 30 cc on the left and 60 cc on the right, 3/20/2024 30 cc on the left and 60 cc on the right, 60 cc bilaterally on 3/26/24 for total of 380 cc on the left and 440 cc on the right.

## 2024-05-15 ENCOUNTER — APPOINTMENT (OUTPATIENT)
Dept: PLASTIC SURGERY | Facility: CLINIC | Age: 49
End: 2024-05-15
Payer: COMMERCIAL

## 2024-05-15 VITALS
DIASTOLIC BLOOD PRESSURE: 82 MMHG | TEMPERATURE: 98 F | BODY MASS INDEX: 29.53 KG/M2 | HEART RATE: 103 BPM | SYSTOLIC BLOOD PRESSURE: 110 MMHG | HEIGHT: 64 IN | WEIGHT: 173 LBS | OXYGEN SATURATION: 98 %

## 2024-05-15 DIAGNOSIS — Z42.1 ENCOUNTER FOR BREAST RECONSTRUCTION FOLLOWING MASTECTOMY: ICD-10-CM

## 2024-05-15 DIAGNOSIS — Z90.13 ACQUIRED ABSENCE OF BILATERAL BREASTS AND NIPPLES: ICD-10-CM

## 2024-05-15 DIAGNOSIS — Z09 ENCOUNTER FOR FOLLOW-UP EXAMINATION AFTER COMPLETED TREATMENT FOR CONDITIONS OTHER THAN MALIGNANT NEOPLASM: ICD-10-CM

## 2024-05-15 PROCEDURE — 99213 OFFICE O/P EST LOW 20 MIN: CPT

## 2024-05-15 NOTE — PHYSICAL EXAM
[NI] : Normal [de-identified] : Scars are healed nicely. Skin of both reconstructed breasts are well moisturized. Some irregularity of contour over the Usama as expected. [de-identified] : No breasts, s/p bilateral mastectomies

## 2024-05-15 NOTE — ASSESSMENT
[FreeTextEntry1] : I reassured pt that the Usama can remain in place while she receives all of her adjuvant therapy and that I will continue to monitor her progress. No changes needed at this time. Follow up in 2-3 months. Call for any questions or concerns.

## 2024-05-15 NOTE — HISTORY OF PRESENT ILLNESS
[FreeTextEntry1] : Pt reports she started chemo three weeks ago and her next dose (the second of four) is due soon. She is using the cold cap but her hair is coming out. She plans to get a wig. She reports she is feeling ok physically, but is psychologically having a hard time with the whole process. She states her breasts are ok and is wondering how long the Usama can stay in place.

## 2024-05-15 NOTE — REASON FOR VISIT
[Follow-Up: _____] : a [unfilled] follow-up visit [FreeTextEntry1] : patient states everything is good and has no complaints. She is currently undergoing chemo.

## 2024-06-07 ENCOUNTER — APPOINTMENT (OUTPATIENT)
Dept: PHYSICAL MEDICINE AND REHAB | Facility: CLINIC | Age: 49
End: 2024-06-07
Payer: COMMERCIAL

## 2024-06-07 VITALS
HEART RATE: 80 BPM | HEIGHT: 64 IN | DIASTOLIC BLOOD PRESSURE: 87 MMHG | SYSTOLIC BLOOD PRESSURE: 142 MMHG | WEIGHT: 170 LBS | BODY MASS INDEX: 29.02 KG/M2 | OXYGEN SATURATION: 96 %

## 2024-06-07 DIAGNOSIS — C50.919 MALIGNANT NEOPLASM OF UNSPECIFIED SITE OF UNSPECIFIED FEMALE BREAST: ICD-10-CM

## 2024-06-07 DIAGNOSIS — Z91.89 OTHER SPECIFIED PERSONAL RISK FACTORS, NOT ELSEWHERE CLASSIFIED: ICD-10-CM

## 2024-06-07 PROCEDURE — 93702 BIS XTRACELL FLUID ANALYSIS: CPT

## 2024-06-07 PROCEDURE — 99203 OFFICE O/P NEW LOW 30 MIN: CPT | Mod: 25

## 2024-06-07 NOTE — PHYSICAL EXAM
[FreeTextEntry1] : Gen: Patient is A&O x 3, NAD HEENT: EOMI, hearing grossly normal Resp: regular, non - labored CV: pulses regular Skin: no rashes, erythema Lymph: no clubbing, cyanosis, edema Inspection: no instability ROM: full throughout Palpation: no tenderness to palpation Sensation: intact to light touch Reflexes: 1+ and symmetric throughout Strength: 5/5 throughout Gait: normal, non-antalgic  Bioimpedance spectroscopy with L-dex  LUE -5.9 (pre-operative baseline -4.4)

## 2024-06-07 NOTE — DATA REVIEWED
[FreeTextEntry1] : January 2024 left axilla ultrasound: No evidence of left axillary lymphadenopathy. Bilateral benign-appearing axillary lymph nodes demonstrated  January 2024 MRI breast: Newly diagnosed malignancy in the left breast. Extensive asymmetric non mass enhancement occupying most of the upper outer quadrant of the left breast, corresponding in size and distribution to abnormal calcifications noted by mammography.

## 2024-06-07 NOTE — HISTORY OF PRESENT ILLNESS
[FreeTextEntry1] : Ms. Johnson is a 48 year old female with Left UOQ [id number] --> [id number] IDC, grade 1 w/DCIS, grade 2, ER 90%, KS/HER2 negative, s/p Bilateral skin sparing mastectomy with TE and left SLNB (2/3) 2/6/24, FINAL PATH: right breast - benign breast tissue, left breast - [id number] multifocal (at least 4 foci) invasive ductal carcinoma with micropapillary features, grade 2, largest focus measure 7.0 mm, DCIS intermediate grade, at least 24.0 mm, one lymph node negative, one lymph node positive for micro metastatic carcinoma, one lymph node positive for isolated tumor cells. She is currently going through chemotherapy TC q3 weeks. Plans for radiation after completion of chemotherapy. Plans for reconstruction in the future.   She does a lot of aerobic exercise and does rebound exercise with a mini trampoline. Denies any heaviness or swelling in UE. Denies any pain. Denies any decreased range of motion.

## 2024-06-07 NOTE — ASSESSMENT
[FreeTextEntry1] : 48 year old female here for evaluation.  #At risk for lymphedema: -No clinical signs of lymphedema on exam -Denies any electronic implants or pregnancy  -Lymphedema education provided to patient -Bioimpedance spectroscopy performed today with L-dex appropriate. -Next Surveillance in September 2024 at Dr. Israel's office -Educated on early signs of lymphedema and to call me if she notices any    #Breast Cancer -Reviewed exercise recommendations: Recommend 150 minutes of moderate intensity aerobic exercise and 2-3 strength trainings per week -Reviewed plastic surgery: s/p tissue expander placement on 2/6/24 -Reviewed breast surgery: s/p Bilateral skin sparing mastectomy and left SLNB (2/3) on 2/6/24   -Follow-up in 6 months or sooner as needed.    The patient had a follow-up SOZO measurement which I reviewed today. Bioimpedance spectroscopy helps identify the onset of lymphedema in an arm or leg before patients experience noticeable swelling. Research has shown that the early detection of lymphedema using L-Dex combined with treatment can reduce progression to chronic lymphedema by 95% in breast cancer patients. Whenever possible, patients are tested for baseline L-Dex score before cancer treatment begins and then are reassessed during regular follow-up visits using the SOZO device. Otherwise, this can be started postoperatively and continued during regular follow-up visits. If the patients L-Dex score increases above normal levels, that is a sign that lymphedema is developing, and a referral is made to physical therapy for further evaluation and/or early compression treatment. Lymphedema assessment with the SOZO L-Dex score is recommended to be done every 3 months for the first 3 years and then every 6 months for years 4 and 5, followed by annually afterwards.

## 2024-07-17 ENCOUNTER — APPOINTMENT (OUTPATIENT)
Dept: PLASTIC SURGERY | Facility: CLINIC | Age: 49
End: 2024-07-17
Payer: COMMERCIAL

## 2024-07-17 VITALS
HEIGHT: 64 IN | DIASTOLIC BLOOD PRESSURE: 82 MMHG | TEMPERATURE: 98.2 F | WEIGHT: 170 LBS | OXYGEN SATURATION: 98 % | SYSTOLIC BLOOD PRESSURE: 114 MMHG | HEART RATE: 79 BPM | BODY MASS INDEX: 29.02 KG/M2

## 2024-07-17 DIAGNOSIS — Z90.13 ACQUIRED ABSENCE OF BILATERAL BREASTS AND NIPPLES: ICD-10-CM

## 2024-07-17 PROCEDURE — 99213 OFFICE O/P EST LOW 20 MIN: CPT

## 2024-10-02 ENCOUNTER — APPOINTMENT (OUTPATIENT)
Dept: SURGERY | Facility: CLINIC | Age: 49
End: 2024-10-02
Payer: COMMERCIAL

## 2024-10-02 VITALS
HEIGHT: 64 IN | BODY MASS INDEX: 32.44 KG/M2 | WEIGHT: 190 LBS | DIASTOLIC BLOOD PRESSURE: 84 MMHG | SYSTOLIC BLOOD PRESSURE: 136 MMHG

## 2024-10-02 DIAGNOSIS — C50.412 MALIGNANT NEOPLASM OF UPPER-OUTER QUADRANT OF LEFT FEMALE BREAST: ICD-10-CM

## 2024-10-02 DIAGNOSIS — Z17.0 MALIGNANT NEOPLASM OF UPPER-OUTER QUADRANT OF LEFT FEMALE BREAST: ICD-10-CM

## 2024-10-02 PROCEDURE — 99214 OFFICE O/P EST MOD 30 MIN: CPT | Mod: 25

## 2024-10-02 PROCEDURE — 93702 BIS XTRACELL FLUID ANALYSIS: CPT | Mod: NC

## 2024-10-02 NOTE — PHYSICAL EXAM
[Normocephalic] : normocephalic [Atraumatic] : atraumatic [EOMI] : extra ocular movement intact [PERRL] : pupils equal, round and reactive to light [Sclera nonicteric] : sclera nonicteric [Conjunctiva pink] : conjunctiva pink [Supple] : supple [No Supraclavicular Adenopathy] : no supraclavicular adenopathy [No Cervical Adenopathy] : no cervical adenopathy [Examined in the supine and seated position] : examined in the supine and seated position [None] : no ptosis [No Axillary Lymphadenopathy] : no left axillary lymphadenopathy [No Edema] : no edema [Asymmetrical] : asymmetrical [de-identified] : R>L. Bilateral breasts and NAC are surgically absent with TE in place.  [de-identified] : Radiation changes  [de-identified] : Palpable, mobile lymph node

## 2024-10-02 NOTE — ASSESSMENT
[FreeTextEntry1] : Patient is a 48 year old female with left DCIS UOQ grade 2 ER 90% [place] 20%. Approx 4 cm area of calcs, MRI 11. 6 CM X 3. 6 CM extends to 1.1 cm deep to nipple, left palpable adenopathy, US- benign appearing lymph nodes. She underwent bilateral skin sparing mastectomy with TE and left SLNB. FINAL PATH: right breast - benign breast tissue, left breast - [id number] multifocal invasive ductal carcinoma with micropapillary features (4 foci), grade 2, largest focus measure 7.0 mm, DCIS intermediate grade, size 24.0 mm, one lymph node negative, one lymph node positive for micro metastatic carcinoma, one lymph node positive for isolated tumor cells. Completed adjuvant chemotherapy TC X 4 july 2024 and radiation september 2024.    CBE is benign. We reviewed indications for imaging in the post-mastectomy setting. We will see her back in 6 months for next CBE.   SOZO obtained in the office today. Next measurement with Dr. Grimaldo in 3 months.   Patient verbalized understanding for all treatment plans discussed. All of her questions were answered to the best of my ability. She was encouraged to call the office if any questions or concerns or come in sooner if needed.   Plan: 1. SOZO measurement  -3.9 (baseline -4.4); next measurement Jan '25 2. Followup in 6 months 3. Followup med onc, rad onc, plastics, PMR

## 2024-10-02 NOTE — PHYSICAL EXAM
[Normocephalic] : normocephalic [Atraumatic] : atraumatic [EOMI] : extra ocular movement intact [PERRL] : pupils equal, round and reactive to light [Sclera nonicteric] : sclera nonicteric [Conjunctiva pink] : conjunctiva pink [Supple] : supple [No Supraclavicular Adenopathy] : no supraclavicular adenopathy [No Cervical Adenopathy] : no cervical adenopathy [Examined in the supine and seated position] : examined in the supine and seated position [None] : no ptosis [No Axillary Lymphadenopathy] : no left axillary lymphadenopathy [No Edema] : no edema [Asymmetrical] : asymmetrical [de-identified] : R>L. Bilateral breasts and NAC are surgically absent with TE in place.  [de-identified] : Radiation changes  [de-identified] : Palpable, mobile lymph node

## 2024-10-02 NOTE — PHYSICAL EXAM
[Normocephalic] : normocephalic [Atraumatic] : atraumatic [EOMI] : extra ocular movement intact [PERRL] : pupils equal, round and reactive to light [Sclera nonicteric] : sclera nonicteric [Conjunctiva pink] : conjunctiva pink [Supple] : supple [No Supraclavicular Adenopathy] : no supraclavicular adenopathy [No Cervical Adenopathy] : no cervical adenopathy [Examined in the supine and seated position] : examined in the supine and seated position [None] : no ptosis [No Axillary Lymphadenopathy] : no left axillary lymphadenopathy [No Edema] : no edema [Asymmetrical] : asymmetrical [de-identified] : R>L. Bilateral breasts and NAC are surgically absent with TE in place.  [de-identified] : Radiation changes  [de-identified] : Palpable, mobile lymph node

## 2024-10-02 NOTE — ASSESSMENT
[FreeTextEntry1] : Patient is a 48 year old female with left DCIS UOQ grade 2 ER 90% [place] 20%. Approx 4 cm area of calcs, MRI 11. 6 CM X 3. 6 CM extends to 1.1 cm deep to nipple, left palpable adenopathy, US- benign appearing lymph nodes. She underwent bilateral skin sparing mastectomy with TE and left SLNB. FINAL PATH: right breast - benign breast tissue, left breast - [id number] multifocal invasive ductal carcinoma with micropapillary features (4 foci), grade 2, largest focus measure 7.0 mm, DCIS intermediate grade, size 24.0 mm, one lymph node negative, one lymph node positive for micro metastatic carcinoma, one lymph node positive for isolated tumor cells. Completed adjuvant chemotherapy TC X 4 july 2024 and radiation september 2024.    CBE is benign. We reviewed indications for imaging in the post-mastectomy setting. We will see her back in 6 months for next CBE.   SOZO obtained in the office today. Next measurement with Dr. Grimaldo in 3 months.   Patient verbalized understanding for all treatment plans discussed. All of her questions were answered to the best of my ability. She was encouraged to call the office if any questions or concerns or come in sooner if needed.   Plan: 1. SOZO measurement  -3.9 (baseline -4.4); next measurement Jan '25 2. Followup in 6 months 3. Followup med onc, rad onc, plastics, PMR

## 2024-10-02 NOTE — HISTORY OF PRESENT ILLNESS
[FreeTextEntry1] : Oncology List:  1. 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 6 cm x 3. 6 cm extends to 1.1 cm deep to nipple      -Left palpable adenopathy, US - benign appearing lymph nodes       -Genetic testing negative for pathogenic mutation; VUS in POLE gene c.3856C>T and [id number] gene c.292G>A      -Bilateral skin sparing mastectomy with TE and left SLNB 24        FINAL PATH: right breast - benign breast tissue, left breast - [id number] multifocal (at least 4 foci) invasive ductal carcinoma with micropapillary features, grade 2, largest focus measure 7.0 mm, DCIS intermediate grade, at least 24.0 mm, one lymph node negative, one lymph node positive for micro metastatic carcinoma, one lymph node positive for isolated tumor cells     -Tamoxifen, stopped during chemotherapy/radiation      -Oncotype Dx 32      -s/p chemotherapy TC x 4 2024     -Radiation completed 2024    Care Team:  GYN - Petty  Med onc-[facility] Rad onc-[facility]   Interval History:  (24): Patient presents for surgical planning. She met with Dr. Freeman to discuss reconstruction options and Dr. Castro from medical oncology. Has decided on bilateral mastectomy with TE with left SLNB.    24: Patient presents for post-op followup. Right drain was removed by Dr. Freeman. Left drain still putting out >30cc/day. No complaints at this time.   (10/02/24): Patient presents for 6 month follow up. She completed 4 cycles TC followed by PMRT 24.  Plan for AI with [facility].    HPI: Patient is a 48-year-old female presenting for initial consultation on 23 for newly diagnosed left DCIS. Patient underwent screening mammogram and breast ultrasound on 23 that demonstrated suspicious clusters of microcalcifications in the left breast. Patient called back on 23 for left diagnostic mammogram that demonstrated suspicious heterogenous grouped amorphous microcalcifications in the upper outer quadrant. Left stereotactic biopsy demonstrated DCIS, intermediate to high nuclear grade, micropapillary pattern with comedo type necrosis, lobular extension and microcalcifications. Patient denies breast pain, lump or nipple discharge.   Breast History: Goes for mammogram every year. No prior breast biopsy. No family history of breast cancer   Smoker No AC or aspirin use NKDA, no latex allergy   Imagin23: [facility]PR: Screening Mammogram Bilateral: Impression - Suspicious clusters of microcalcifications is seen in the left breast. Magnification views and additional radiographic and sonographic evaluation as needed is recommended. BI-RADS 0   23: ZPR: Breast Ultrasound Complete: Impression - Bilateral cysts. BI-RADS 2   23: ZPR: Diagnostic Mammogram Left: Density C, Impression - Suspicious heterogenous grouped amorphous mircocalcifications upper outer quadrant. Stereotactic core needle biopsy is recommended. BI-RADS 4   23: ZPR: Left Stereotactic Core Biopsy: Bar clip, Impression - DCIS, intermediate to high nuclear grade, micropapillary pattern with comedo type necrosis, lobular extension and microcalcifications, ER 90%, [place] 20%, [place]   24: Tony Borges: MRI Breast: Impression - Newly diagnosed malignancy in the left breast. Extensive asymmetric non mass enhancement occupying most of the upper outer quadrant of the left breast, corresponding in size and distribution to abnormal calcifications noted by mammography, overall enhancement spans up to 11. 6 cm x 3.6 cm. Enhancement extends to just 1.1 cm deep to the nipple. BI-RADS 6  24: Tony Borges: Left Axilla US - Impression - no evidence of left axillary lymphadenopathy. Bilateral benign appearing axillary lymph nodes demonstrated.

## 2024-10-02 NOTE — ASSESSMENT
[FreeTextEntry1] : Patient is a 48 year old female with left DCIS UOQ grade 2 ER 90% MD 20%. Approx 4 cm area of calcs, MRI 11. 6 CM X 3. 6 CM extends to 1.1 cm deep to nipple, left palpable adenopathy, US- benign appearing lymph nodes. She underwent bilateral skin sparing mastectomy with TE and left SLNB. FINAL PATH: right breast - benign breast tissue, left breast - [id number] multifocal invasive ductal carcinoma with micropapillary features (4 foci), grade 2, largest focus measure 7.0 mm, DCIS intermediate grade, size 24.0 mm, one lymph node negative, one lymph node positive for micro metastatic carcinoma, one lymph node positive for isolated tumor cells. Completed adjuvant chemotherapy TC X 4 july 2024 and radiation september 2024.    CBE is benign. We reviewed indications for imaging in the post-mastectomy setting. We will see her back in 6 months for next CBE.   SOZO obtained in the office today. Next measurement with Dr. Grimaldo in 3 months.   Patient verbalized understanding for all treatment plans discussed. All of her questions were answered to the best of my ability. She was encouraged to call the office if any questions or concerns or come in sooner if needed.   Plan: 1. SOZO measurement  -3.9 (baseline -4.4); next measurement Jan '25 2. Followup in 6 months 3. Followup med onc, rad onc, plastics, PMR

## 2024-10-02 NOTE — HISTORY OF PRESENT ILLNESS
[FreeTextEntry1] : Oncology List:  1. Left UOQ cTisN0 --> [id number] IDC, grade 1 w/DCIS, grade 2, ER 90%, [place]/HER2 negative       -Approx 4cm area of calcs, MRI 11. 6 cm x 3. 6 cm extends to 1.1 cm deep to nipple      -Left palpable adenopathy, US - benign appearing lymph nodes       -Genetic testing negative for pathogenic mutation; VUS in POLE gene c.3856C>T and [id number] gene c.292G>A      -Bilateral skin sparing mastectomy with TE and left SLNB 24        FINAL PATH: right breast - benign breast tissue, left breast - [id number] multifocal (at least 4 foci) invasive ductal carcinoma with micropapillary features, grade 2, largest focus measure 7.0 mm, DCIS intermediate grade, at least 24.0 mm, one lymph node negative, one lymph node positive for micro metastatic carcinoma, one lymph node positive for isolated tumor cells     -Tamoxifen, stopped during chemotherapy/radiation      -Oncotype Dx 32      -s/p chemotherapy TC x 4 2024     -Radiation completed 2024    Care Team:  GYN - Petty  Med onc-[facility] Rad onc-[facility]   Interval History:  (24): Patient presents for surgical planning. She met with Dr. Freeman to discuss reconstruction options and Dr. Castro from medical oncology. Has decided on bilateral mastectomy with TE with left SLNB.    24: Patient presents for post-op followup. Right drain was removed by Dr. Freeman. Left drain still putting out >30cc/day. No complaints at this time.   (10/02/24): Patient presents for 6 month follow up. She completed 4 cycles TC followed by PMRT 24.  Plan for AI with [facility].    HPI: Patient is a 48-year-old female presenting for initial consultation on 23 for newly diagnosed left DCIS. Patient underwent screening mammogram and breast ultrasound on 23 that demonstrated suspicious clusters of microcalcifications in the left breast. Patient called back on 23 for left diagnostic mammogram that demonstrated suspicious heterogenous grouped amorphous microcalcifications in the upper outer quadrant. Left stereotactic biopsy demonstrated DCIS, intermediate to high nuclear grade, micropapillary pattern with comedo type necrosis, lobular extension and microcalcifications. Patient denies breast pain, lump or nipple discharge.   Breast History: Goes for mammogram every year. No prior breast biopsy. No family history of breast cancer   Smoker No AC or aspirin use NKDA, no latex allergy   Imagin23: [facility]PR: Screening Mammogram Bilateral: Impression - Suspicious clusters of microcalcifications is seen in the left breast. Magnification views and additional radiographic and sonographic evaluation as needed is recommended. BI-RADS 0   23: ZPR: Breast Ultrasound Complete: Impression - Bilateral cysts. BI-RADS 2   23: ZPR: Diagnostic Mammogram Left: Density C, Impression - Suspicious heterogenous grouped amorphous mircocalcifications upper outer quadrant. Stereotactic core needle biopsy is recommended. BI-RADS 4   23: ZPR: Left Stereotactic Core Biopsy: Bar clip, Impression - DCIS, intermediate to high nuclear grade, micropapillary pattern with comedo type necrosis, lobular extension and microcalcifications, ER 90%, [place] 20%, [place]   24: Tony Borges: MRI Breast: Impression - Newly diagnosed malignancy in the left breast. Extensive asymmetric non mass enhancement occupying most of the upper outer quadrant of the left breast, corresponding in size and distribution to abnormal calcifications noted by mammography, overall enhancement spans up to 11. 6 cm x 3.6 cm. Enhancement extends to just 1.1 cm deep to the nipple. BI-RADS 6  24: Tony Borges: Left Axilla US - Impression - no evidence of left axillary lymphadenopathy. Bilateral benign appearing axillary lymph nodes demonstrated.

## 2024-10-02 NOTE — HISTORY OF PRESENT ILLNESS
[FreeTextEntry1] : Oncology List:  1. Left UOQ cTisN0 --> [id number] IDC, grade 1 w/DCIS, grade 2, ER 90%, [place]/HER2 negative       -Approx 4cm area of calcs, MRI 11. 6 cm x 3. 6 cm extends to 1.1 cm deep to nipple      -Left palpable adenopathy, US - benign appearing lymph nodes       -Genetic testing negative for pathogenic mutation; VUS in POLE gene c.3856C>T and [id number] gene c.292G>A      -Bilateral skin sparing mastectomy with TE and left SLNB 24        FINAL PATH: right breast - benign breast tissue, left breast - [id number] multifocal (at least 4 foci) invasive ductal carcinoma with micropapillary features, grade 2, largest focus measure 7.0 mm, DCIS intermediate grade, at least 24.0 mm, one lymph node negative, one lymph node positive for micro metastatic carcinoma, one lymph node positive for isolated tumor cells     -Tamoxifen, stopped during chemotherapy/radiation      -Oncotype Dx 32      -s/p chemotherapy TC x 4 2024     -Radiation completed 2024    Care Team:  GYN - Petty  Med onc-[facility] Rad [facility]   Interval History:  (24): Patient presents for surgical planning. She met with Dr. Freeman to discuss reconstruction options and Dr. Castro from medical oncology. Has decided on bilateral mastectomy with TE with left SLNB.    24: Patient presents for post-op followup. Right drain was removed by Dr. Freeman. Left drain still putting out >30cc/day. No complaints at this time.   (10/02/24): Patient presents for 6 month follow up. She completed 4 cycles TC followed by PMRT 24.  Plan for AI with OFS.    HPI: Patient is a 48-year-old female presenting for initial consultation on 23 for newly diagnosed left DCIS. Patient underwent screening mammogram and breast ultrasound on 23 that demonstrated suspicious clusters of microcalcifications in the left breast. Patient called back on 23 for left diagnostic mammogram that demonstrated suspicious heterogenous grouped amorphous microcalcifications in the upper outer quadrant. Left stereotactic biopsy demonstrated DCIS, intermediate to high nuclear grade, micropapillary pattern with comedo type necrosis, lobular extension and microcalcifications. Patient denies breast pain, lump or nipple discharge.   Breast History: Goes for mammogram every year. No prior breast biopsy. No family history of breast cancer   Smoker No AC or aspirin use NKDA, no latex allergy   Imagin23: ZPR: Screening Mammogram Bilateral: Impression - Suspicious clusters of microcalcifications is seen in the left breast. Magnification views and additional radiographic and sonographic evaluation as needed is recommended. BI-RADS 0   23: ZPR: Breast Ultrasound Complete: Impression - Bilateral cysts. BI-RADS 2   23: ZPR: Diagnostic Mammogram Left: Density C, Impression - Suspicious heterogenous grouped amorphous mircocalcifications upper outer quadrant. Stereotactic core needle biopsy is recommended. BI-RADS 4   23: ZPR: Left Stereotactic Core Biopsy: Bar clip, Impression - DCIS, intermediate to high nuclear grade, micropapillary pattern with comedo type necrosis, lobular extension and microcalcifications, ER 90%, [place] 20%, [place]   24: Tony Borges: MRI Breast: Impression - Newly diagnosed malignancy in the left breast. Extensive asymmetric non mass enhancement occupying most of the upper outer quadrant of the left breast, corresponding in size and distribution to abnormal calcifications noted by mammography, overall enhancement spans up to 11. 6 cm x 3.6 cm. Enhancement extends to just 1.1 cm deep to the nipple. BI-RADS 6  24: Tony Borges: Left Axilla US - Impression - no evidence of left axillary lymphadenopathy. Bilateral benign appearing axillary lymph nodes demonstrated.

## 2024-10-08 ENCOUNTER — APPOINTMENT (OUTPATIENT)
Dept: PLASTIC SURGERY | Facility: CLINIC | Age: 49
End: 2024-10-08
Payer: COMMERCIAL

## 2024-10-08 VITALS
BODY MASS INDEX: 33.63 KG/M2 | WEIGHT: 197 LBS | DIASTOLIC BLOOD PRESSURE: 96 MMHG | TEMPERATURE: 97.4 F | OXYGEN SATURATION: 98 % | SYSTOLIC BLOOD PRESSURE: 160 MMHG | HEART RATE: 87 BPM | HEIGHT: 64 IN

## 2024-10-08 DIAGNOSIS — Z90.13 ACQUIRED ABSENCE OF BILATERAL BREASTS AND NIPPLES: ICD-10-CM

## 2024-10-08 PROCEDURE — 99214 OFFICE O/P EST MOD 30 MIN: CPT

## 2024-10-08 RX ORDER — EXEMESTANE 25 MG/1
25 TABLET ORAL
Refills: 0 | Status: ACTIVE | COMMUNITY

## 2024-10-08 RX ORDER — GOSERELIN ACETATE 3.6 MG/1
3.6 IMPLANT SUBCUTANEOUS
Refills: 0 | Status: ACTIVE | COMMUNITY

## 2024-11-01 ENCOUNTER — NON-APPOINTMENT (OUTPATIENT)
Age: 49
End: 2024-11-01

## 2024-11-01 ENCOUNTER — APPOINTMENT (OUTPATIENT)
Dept: OBGYN | Facility: CLINIC | Age: 49
End: 2024-11-01
Payer: COMMERCIAL

## 2024-11-01 VITALS
DIASTOLIC BLOOD PRESSURE: 80 MMHG | SYSTOLIC BLOOD PRESSURE: 120 MMHG | HEIGHT: 64 IN | WEIGHT: 198 LBS | BODY MASS INDEX: 33.8 KG/M2

## 2024-11-01 DIAGNOSIS — Z85.3 PERSONAL HISTORY OF MALIGNANT NEOPLASM OF BREAST: ICD-10-CM

## 2024-11-01 DIAGNOSIS — Z17.0 PERSONAL HISTORY OF MALIGNANT NEOPLASM OF BREAST: ICD-10-CM

## 2024-11-01 DIAGNOSIS — Z01.419 ENCOUNTER FOR GYNECOLOGICAL EXAMINATION (GENERAL) (ROUTINE) W/OUT ABNORMAL FINDINGS: ICD-10-CM

## 2024-11-01 DIAGNOSIS — Z86.000 PERSONAL HISTORY OF IN-SITU NEOPLASM OF BREAST: ICD-10-CM

## 2024-11-01 PROCEDURE — 99396 PREV VISIT EST AGE 40-64: CPT

## 2024-11-01 PROCEDURE — 99459 PELVIC EXAMINATION: CPT

## 2024-11-04 LAB — HPV HIGH+LOW RISK DNA PNL CVX: NOT DETECTED

## 2024-11-06 LAB — CYTOLOGY CVX/VAG DOC THIN PREP: ABNORMAL

## 2024-12-13 ENCOUNTER — APPOINTMENT (OUTPATIENT)
Dept: PHYSICAL MEDICINE AND REHAB | Facility: CLINIC | Age: 49
End: 2024-12-13
Payer: COMMERCIAL

## 2024-12-13 DIAGNOSIS — M25.50 PAIN IN UNSPECIFIED JOINT: ICD-10-CM

## 2024-12-13 DIAGNOSIS — Z91.89 OTHER SPECIFIED PERSONAL RISK FACTORS, NOT ELSEWHERE CLASSIFIED: ICD-10-CM

## 2024-12-13 DIAGNOSIS — T45.1X5A PAIN IN UNSPECIFIED JOINT: ICD-10-CM

## 2024-12-13 PROCEDURE — 99213 OFFICE O/P EST LOW 20 MIN: CPT | Mod: 25

## 2024-12-13 PROCEDURE — 93702 BIS XTRACELL FLUID ANALYSIS: CPT

## 2025-01-08 ENCOUNTER — APPOINTMENT (OUTPATIENT)
Dept: PLASTIC SURGERY | Facility: CLINIC | Age: 50
End: 2025-01-08
Payer: COMMERCIAL

## 2025-01-08 VITALS
DIASTOLIC BLOOD PRESSURE: 75 MMHG | OXYGEN SATURATION: 99 % | TEMPERATURE: 98 F | HEART RATE: 104 BPM | WEIGHT: 197 LBS | SYSTOLIC BLOOD PRESSURE: 145 MMHG | BODY MASS INDEX: 33.82 KG/M2

## 2025-01-08 DIAGNOSIS — Z90.13 ACQUIRED ABSENCE OF BILATERAL BREASTS AND NIPPLES: ICD-10-CM

## 2025-01-08 DIAGNOSIS — N65.0 DEFORMITY OF RECONSTRUCTED BREAST: ICD-10-CM

## 2025-01-08 PROCEDURE — 99213 OFFICE O/P EST LOW 20 MIN: CPT

## 2025-03-25 ENCOUNTER — APPOINTMENT (OUTPATIENT)
Dept: PLASTIC SURGERY | Facility: CLINIC | Age: 50
End: 2025-03-25

## 2025-03-25 VITALS
SYSTOLIC BLOOD PRESSURE: 155 MMHG | DIASTOLIC BLOOD PRESSURE: 93 MMHG | BODY MASS INDEX: 33.63 KG/M2 | HEIGHT: 64 IN | WEIGHT: 197 LBS

## 2025-03-25 DIAGNOSIS — Z90.13 ACQUIRED ABSENCE OF BILATERAL BREASTS AND NIPPLES: ICD-10-CM

## 2025-03-25 DIAGNOSIS — N65.0 DEFORMITY OF RECONSTRUCTED BREAST: ICD-10-CM

## 2025-03-25 PROCEDURE — 99214 OFFICE O/P EST MOD 30 MIN: CPT

## 2025-03-25 RX ORDER — METFORMIN HYDROCHLORIDE 750 MG/1
TABLET ORAL
Refills: 0 | Status: ACTIVE | COMMUNITY

## 2025-04-02 ENCOUNTER — APPOINTMENT (OUTPATIENT)
Facility: CLINIC | Age: 50
End: 2025-04-02
Payer: COMMERCIAL

## 2025-04-02 VITALS
HEIGHT: 64 IN | HEART RATE: 75 BPM | DIASTOLIC BLOOD PRESSURE: 80 MMHG | WEIGHT: 197 LBS | OXYGEN SATURATION: 97 % | BODY MASS INDEX: 33.63 KG/M2 | SYSTOLIC BLOOD PRESSURE: 140 MMHG | RESPIRATION RATE: 14 BRPM

## 2025-04-02 DIAGNOSIS — Z91.89 OTHER SPECIFIED PERSONAL RISK FACTORS, NOT ELSEWHERE CLASSIFIED: ICD-10-CM

## 2025-04-02 DIAGNOSIS — C50.919 MALIGNANT NEOPLASM OF UNSPECIFIED SITE OF UNSPECIFIED FEMALE BREAST: ICD-10-CM

## 2025-04-02 PROCEDURE — 93702 BIS XTRACELL FLUID ANALYSIS: CPT | Mod: NC

## 2025-04-02 PROCEDURE — 99213 OFFICE O/P EST LOW 20 MIN: CPT | Mod: 25

## 2025-04-05 ENCOUNTER — NON-APPOINTMENT (OUTPATIENT)
Age: 50
End: 2025-04-05

## 2025-04-08 ENCOUNTER — NON-APPOINTMENT (OUTPATIENT)
Age: 50
End: 2025-04-08

## 2025-04-09 ENCOUNTER — APPOINTMENT (OUTPATIENT)
Dept: PLASTIC SURGERY | Facility: CLINIC | Age: 50
End: 2025-04-09
Payer: COMMERCIAL

## 2025-04-09 VITALS
SYSTOLIC BLOOD PRESSURE: 158 MMHG | TEMPERATURE: 98.1 F | HEART RATE: 81 BPM | HEIGHT: 64 IN | OXYGEN SATURATION: 98 % | WEIGHT: 196.38 LBS | BODY MASS INDEX: 33.53 KG/M2 | DIASTOLIC BLOOD PRESSURE: 78 MMHG

## 2025-04-09 DIAGNOSIS — Z90.13 ACQUIRED ABSENCE OF BILATERAL BREASTS AND NIPPLES: ICD-10-CM

## 2025-04-09 DIAGNOSIS — N65.0 DEFORMITY OF RECONSTRUCTED BREAST: ICD-10-CM

## 2025-04-09 DIAGNOSIS — Z09 ENCOUNTER FOR FOLLOW-UP EXAMINATION AFTER COMPLETED TREATMENT FOR CONDITIONS OTHER THAN MALIGNANT NEOPLASM: ICD-10-CM

## 2025-04-09 PROCEDURE — 99214 OFFICE O/P EST MOD 30 MIN: CPT

## 2025-05-01 ENCOUNTER — APPOINTMENT (OUTPATIENT)
Dept: PLASTIC SURGERY | Facility: CLINIC | Age: 50
End: 2025-05-01

## 2025-05-01 VITALS
WEIGHT: 196 LBS | BODY MASS INDEX: 33.46 KG/M2 | OXYGEN SATURATION: 100 % | HEART RATE: 71 BPM | TEMPERATURE: 97.9 F | SYSTOLIC BLOOD PRESSURE: 148 MMHG | HEIGHT: 64 IN | DIASTOLIC BLOOD PRESSURE: 68 MMHG

## 2025-05-01 DIAGNOSIS — Z09 ENCOUNTER FOR FOLLOW-UP EXAMINATION AFTER COMPLETED TREATMENT FOR CONDITIONS OTHER THAN MALIGNANT NEOPLASM: ICD-10-CM

## 2025-05-01 PROCEDURE — 99024 POSTOP FOLLOW-UP VISIT: CPT

## 2025-05-01 RX ORDER — METHYLDOPA/HYDROCHLOROTHIAZIDE 250MG-15MG
TABLET ORAL
Refills: 0 | Status: ACTIVE | COMMUNITY

## 2025-07-01 ENCOUNTER — APPOINTMENT (OUTPATIENT)
Facility: CLINIC | Age: 50
End: 2025-07-01
Payer: COMMERCIAL

## 2025-07-01 VITALS — BODY MASS INDEX: 33.63 KG/M2 | WEIGHT: 197 LBS | HEIGHT: 64 IN

## 2025-07-01 PROCEDURE — 93702 BIS XTRACELL FLUID ANALYSIS: CPT

## 2025-07-01 PROCEDURE — 99213 OFFICE O/P EST LOW 20 MIN: CPT | Mod: 25

## 2025-08-25 ENCOUNTER — NON-APPOINTMENT (OUTPATIENT)
Age: 50
End: 2025-08-25

## 2025-08-26 ENCOUNTER — APPOINTMENT (OUTPATIENT)
Dept: PLASTIC SURGERY | Facility: CLINIC | Age: 50
End: 2025-08-26
Payer: COMMERCIAL

## 2025-08-26 VITALS
WEIGHT: 198 LBS | DIASTOLIC BLOOD PRESSURE: 91 MMHG | HEART RATE: 74 BPM | OXYGEN SATURATION: 98 % | SYSTOLIC BLOOD PRESSURE: 155 MMHG | TEMPERATURE: 97.5 F | HEIGHT: 64 IN | BODY MASS INDEX: 33.8 KG/M2

## 2025-08-26 DIAGNOSIS — Z90.13 ACQUIRED ABSENCE OF BILATERAL BREASTS AND NIPPLES: ICD-10-CM

## 2025-08-26 PROCEDURE — 99215 OFFICE O/P EST HI 40 MIN: CPT

## 2025-08-28 ENCOUNTER — NON-APPOINTMENT (OUTPATIENT)
Age: 50
End: 2025-08-28

## 2025-09-19 RX ORDER — OXYCODONE 5 MG/1
5 TABLET ORAL EVERY 6 HOURS
Qty: 5 | Refills: 0 | Status: ACTIVE | COMMUNITY
Start: 2025-09-19 | End: 1900-01-01

## 2025-09-19 RX ORDER — CEPHALEXIN 500 MG/1
500 CAPSULE ORAL
Qty: 21 | Refills: 0 | Status: ACTIVE | COMMUNITY
Start: 2025-09-19 | End: 1900-01-01

## 2025-09-21 PROBLEM — R73.03 PREDIABETES: Status: ACTIVE | Noted: 2025-09-21
